# Patient Record
Sex: FEMALE | Race: WHITE
[De-identification: names, ages, dates, MRNs, and addresses within clinical notes are randomized per-mention and may not be internally consistent; named-entity substitution may affect disease eponyms.]

---

## 2018-05-10 ENCOUNTER — HOSPITAL ENCOUNTER (OUTPATIENT)
Dept: HOSPITAL 45 - C.ULTR | Age: 73
Discharge: HOME | End: 2018-05-10
Attending: INTERNAL MEDICINE
Payer: COMMERCIAL

## 2018-05-10 DIAGNOSIS — R10.11: Primary | ICD-10-CM

## 2018-05-10 DIAGNOSIS — R10.13: ICD-10-CM

## 2018-05-10 NOTE — DIAGNOSTIC IMAGING REPORT
ABDOMEN LIMITED (US)



HISTORY:  73 years-old Female RUQ PAIN,EPIGASTRIC PAIN, GALLSTONES VS INFECTION

acute right upper quadrant abdominal pain



COMPARISON: None available



TECHNIQUE: Multiple real-time sonographic images of the abdominal right upper

quadrant were obtained assessing grayscale appearance and color flow



FINDINGS: 

Pancreas is mostly obscured by bowel gas with the visualized portions appearing

unremarkable. The liver appears to be within normal limits without focal mass or

intrahepatic biliary ductal dilation. Sonographer reports hepatopedal flow

within the main portal vein. Gallbladder is unremarkable without wall

thickening, shadowing cholelithiasis or pericholecystic fluid. Minimal low level

internal echoes within the gallbladder lumen suggests gallbladder sludge. Common

bile duct is normal, 5 mm.



Imaged right kidney is unremarkable without hydronephrosis.



IMPRESSION: 

1. Suggested mild gallbladder sludge without cholelithiasis or sonographic

evidence of acute cholecystitis.

2. No biliary ductal dilation. 







The above report was generated using voice recognition software. It may contain

grammatical, syntax or spelling errors.







Electronically signed by:  Roland Kent M.D.

5/10/2018 6:57 PM



Dictated Date/Time:  5/10/2018 6:54 PM